# Patient Record
Sex: FEMALE | Race: WHITE | HISPANIC OR LATINO | URBAN - METROPOLITAN AREA
[De-identification: names, ages, dates, MRNs, and addresses within clinical notes are randomized per-mention and may not be internally consistent; named-entity substitution may affect disease eponyms.]

---

## 2019-09-04 ENCOUNTER — EMERGENCY (EMERGENCY)
Facility: HOSPITAL | Age: 34
LOS: 1 days | Discharge: HOME | End: 2019-09-04
Attending: EMERGENCY MEDICINE | Admitting: EMERGENCY MEDICINE
Payer: MEDICAID

## 2019-09-04 VITALS
OXYGEN SATURATION: 100 % | TEMPERATURE: 99 F | DIASTOLIC BLOOD PRESSURE: 84 MMHG | RESPIRATION RATE: 16 BRPM | HEART RATE: 97 BPM | SYSTOLIC BLOOD PRESSURE: 118 MMHG

## 2019-09-04 PROCEDURE — 99282 EMERGENCY DEPT VISIT SF MDM: CPT
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
## 2019-09-05 NOTE — ED PROVIDER NOTE - LEFT FACE
(+)left lateral periorbital ecchymosis and swelling, (+)tenderness to site and over lateral orbital rim/SWELLING/TENDERNESS TO PALPATION

## 2019-09-05 NOTE — ED PROVIDER NOTE - PATIENT PORTAL LINK FT
You can access the FollowMyHealth Patient Portal offered by [facility] by registering at the following website: [email or web address]. By joining [email or web address]’s FollowMyHealth portal, you will also be able to view your health information using other applications (apps) compatible with our system.

## 2019-09-05 NOTE — ED PROVIDER NOTE - PROGRESS NOTE DETAILS
Khadar TOWNSEND: Pt states she does not want to wait for CT.  Understands risks of leaving prior to complete evaluation.  Encouraged to f/u with PMD for further testing.  Pt states she feels safe going home.
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
## 2019-09-05 NOTE — ED PROVIDER NOTE - PHYSICAL EXAMINATION
(+)superficial abrasion <1cm lateral to right of mouth, no active bleeding, not through and through, no involvement of vermillion border
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
## 2019-09-05 NOTE — ED PROVIDER NOTE - EYES, MLM
Clear bilaterally, pupils equal, round and reactive to light.  EOMI without pain with movement, no proptosis.  Vision grossly normal.

## 2023-03-28 ENCOUNTER — APPOINTMENT (OUTPATIENT)
Dept: FAMILY MEDICINE | Facility: CLINIC | Age: 38
End: 2023-03-28

## 2023-04-06 ENCOUNTER — NON-APPOINTMENT (OUTPATIENT)
Age: 38
End: 2023-04-06

## 2023-04-06 ENCOUNTER — APPOINTMENT (OUTPATIENT)
Dept: FAMILY MEDICINE | Facility: CLINIC | Age: 38
End: 2023-04-06
Payer: MEDICAID

## 2023-04-06 VITALS
BODY MASS INDEX: 21.62 KG/M2 | TEMPERATURE: 98 F | HEIGHT: 63 IN | SYSTOLIC BLOOD PRESSURE: 110 MMHG | WEIGHT: 122 LBS | HEART RATE: 86 BPM | OXYGEN SATURATION: 99 % | DIASTOLIC BLOOD PRESSURE: 72 MMHG

## 2023-04-06 DIAGNOSIS — L42 PITYRIASIS ROSEA: ICD-10-CM

## 2023-04-06 DIAGNOSIS — Z00.00 ENCOUNTER FOR GENERAL ADULT MEDICAL EXAMINATION W/OUT ABNORMAL FINDINGS: ICD-10-CM

## 2023-04-06 PROCEDURE — 99385 PREV VISIT NEW AGE 18-39: CPT | Mod: 25

## 2023-04-06 NOTE — HISTORY OF PRESENT ILLNESS
[FreeTextEntry1] : New patient physical exam [de-identified] : 37-year-old female for new patient office visit.  Patient reporting rash since January all over her body.  Did see dermatologist and urgent care.  Was given triamcinolone and mometasone.  Was also given course prednisone and Zyrtec.  However patient could not tolerate oral steroids.  Notes side effect of bloating.  Rash is improving.  She did have blood work ordered by Derm which she has not done yet.  Denies any itching.

## 2023-04-06 NOTE — PHYSICAL EXAM
[No Acute Distress] : no acute distress [Well Nourished] : well nourished [Well Developed] : well developed [Well-Appearing] : well-appearing [Normal Voice/Communication] : normal voice/communication [Normal Sclera/Conjunctiva] : normal sclera/conjunctiva [No Respiratory Distress] : no respiratory distress  [Normal Rate] : normal rate

## 2023-04-06 NOTE — REVIEW OF SYSTEMS
[Skin Rash] : skin rash [Fever] : no fever [Fatigue] : no fatigue [Discharge] : no discharge [Earache] : no earache [Sore Throat] : no sore throat [Chest Pain] : no chest pain [Palpitations] : no palpitations [Shortness Of Breath] : no shortness of breath [Cough] : no cough [Abdominal Pain] : no abdominal pain [Dysuria] : no dysuria [Headache] : no headache

## 2023-04-06 NOTE — PLAN
[FreeTextEntry1] : 37-year-old female for new patient physical exam.  Routine labs ordered today.  Patient does have upcoming appoint with GYN for routine Pap.\par \par Pityriasis rosea.  Improved.  Discussed follow-up with dermatology.\par \par All questions answered.  Patient voiced understanding and in agreement to plan.  Return to clinic as recommended.

## 2023-04-10 LAB
ALBUMIN SERPL ELPH-MCNC: 4.2 G/DL
ALP BLD-CCNC: 56 U/L
ALT SERPL-CCNC: 8 U/L
ANA PAT FLD IF-IMP: ABNORMAL
ANA SER IF-ACNC: ABNORMAL
ANION GAP SERPL CALC-SCNC: 10 MMOL/L
APPEARANCE: CLEAR
AST SERPL-CCNC: 15 U/L
BASOPHILS # BLD AUTO: 0.02 K/UL
BASOPHILS NFR BLD AUTO: 0.4 %
BILIRUB SERPL-MCNC: 0.7 MG/DL
BILIRUBIN URINE: NEGATIVE
BLOOD URINE: NEGATIVE
BUN SERPL-MCNC: 8 MG/DL
CALCIUM SERPL-MCNC: 9.1 MG/DL
CHLORIDE SERPL-SCNC: 105 MMOL/L
CHOLEST SERPL-MCNC: 210 MG/DL
CO2 SERPL-SCNC: 22 MMOL/L
COLOR: YELLOW
CREAT SERPL-MCNC: 0.62 MG/DL
EGFR: 117 ML/MIN/1.73M2
EOSINOPHIL # BLD AUTO: 0.08 K/UL
EOSINOPHIL NFR BLD AUTO: 1.7 %
ESTIMATED AVERAGE GLUCOSE: 111 MG/DL
GLUCOSE QUALITATIVE U: NEGATIVE
GLUCOSE SERPL-MCNC: 90 MG/DL
HBA1C MFR BLD HPLC: 5.5 %
HCT VFR BLD CALC: 42.4 %
HDLC SERPL-MCNC: 69 MG/DL
HGB BLD-MCNC: 13.2 G/DL
IMM GRANULOCYTES NFR BLD AUTO: 0 %
KETONES URINE: NEGATIVE
LDLC SERPL CALC-MCNC: 128 MG/DL
LEUKOCYTE ESTERASE URINE: NEGATIVE
LYMPHOCYTES # BLD AUTO: 1.68 K/UL
LYMPHOCYTES NFR BLD AUTO: 35.1 %
MAN DIFF?: NORMAL
MCHC RBC-ENTMCNC: 28 PG
MCHC RBC-ENTMCNC: 31.1 GM/DL
MCV RBC AUTO: 90 FL
MONOCYTES # BLD AUTO: 0.4 K/UL
MONOCYTES NFR BLD AUTO: 8.4 %
NEUTROPHILS # BLD AUTO: 2.61 K/UL
NEUTROPHILS NFR BLD AUTO: 54.4 %
NITRITE URINE: NEGATIVE
NONHDLC SERPL-MCNC: 141 MG/DL
PH URINE: 7
PLATELET # BLD AUTO: 179 K/UL
POTASSIUM SERPL-SCNC: 3.9 MMOL/L
PROT SERPL-MCNC: 6.4 G/DL
PROTEIN URINE: NORMAL
RBC # BLD: 4.71 M/UL
RBC # FLD: 14.6 %
SODIUM SERPL-SCNC: 138 MMOL/L
SPECIFIC GRAVITY URINE: 1.02
TRIGL SERPL-MCNC: 66 MG/DL
TSH SERPL-ACNC: 1.02 UIU/ML
UROBILINOGEN URINE: NORMAL
WBC # FLD AUTO: 4.79 K/UL

## 2023-04-19 ENCOUNTER — LABORATORY RESULT (OUTPATIENT)
Age: 38
End: 2023-04-19

## 2023-04-19 ENCOUNTER — APPOINTMENT (OUTPATIENT)
Dept: RHEUMATOLOGY | Facility: CLINIC | Age: 38
End: 2023-04-19
Payer: MEDICAID

## 2023-04-19 ENCOUNTER — NON-APPOINTMENT (OUTPATIENT)
Age: 38
End: 2023-04-19

## 2023-04-19 VITALS
BODY MASS INDEX: 21.44 KG/M2 | DIASTOLIC BLOOD PRESSURE: 72 MMHG | SYSTOLIC BLOOD PRESSURE: 118 MMHG | HEIGHT: 63 IN | HEART RATE: 87 BPM | TEMPERATURE: 97.6 F | WEIGHT: 121 LBS | OXYGEN SATURATION: 100 %

## 2023-04-19 DIAGNOSIS — M54.2 CERVICALGIA: ICD-10-CM

## 2023-04-19 DIAGNOSIS — Z78.9 OTHER SPECIFIED HEALTH STATUS: ICD-10-CM

## 2023-04-19 DIAGNOSIS — Z82.49 FAMILY HISTORY OF ISCHEMIC HEART DISEASE AND OTHER DISEASES OF THE CIRCULATORY SYSTEM: ICD-10-CM

## 2023-04-19 PROCEDURE — 99205 OFFICE O/P NEW HI 60 MIN: CPT

## 2023-04-19 RX ORDER — PNV NO.95/FERROUS FUM/FOLIC AC 28MG-0.8MG
TABLET ORAL
Refills: 0 | Status: ACTIVE | COMMUNITY

## 2023-04-19 NOTE — HISTORY OF PRESENT ILLNESS
[FreeTextEntry1] : 37-year-old [place] female, here for the first time w/ +KAUR 1:80 Speckled; with diffuse rash perhaps pityriasis rosea to see Derm as requested. \par Patient states she had this diffuse rash started in 2023 after she had a cold.  Patient states the rash was itchy initially and not as itchy now.\par States she has history of allergies to multiple things including dust, cats.  \par Denies any fever or chills at this time.\par Denies any malar rash thus far.\par Denies any photosensitivity thus far.\par Denies any nasal ulcers or oral ulcers or genital ulcers thus far.\par Denies any Raynaud's-like symptoms at this time.\par Denies any dry eyes.\par Denies any dry mouth.\par Denies any infectious diarrhea or  symptoms at this time.\par Denies any history of blood clots.\par Denies any history of stroke.\par States she has had  labor with 2 -3 of her pregnancies with total of 6 pregnancies thus far.\par States she had 1 miscarriage at about 5 to 6 weeks into pregnancy.\par States she notices intermittent neck pain without paresthesias perhaps with holding the baby for a long time.\par Denies any swelling or redness or warmth of any joints.\par Denies any family history of lupus to her knowledge.\par \par \par

## 2023-04-19 NOTE — PHYSICAL EXAM
[General Appearance - Alert] : alert [General Appearance - In No Acute Distress] : in no acute distress [Sclera] : the sclera and conjunctiva were normal [Extraocular Movements] : extraocular movements were intact [Outer Ear] : the ears and nose were normal in appearance [Neck Appearance] : the appearance of the neck was normal [] : no respiratory distress [Respiration, Rhythm And Depth] : normal respiratory rhythm and effort [Heart Rate And Rhythm] : heart rate was normal and rhythm regular [Heart Sounds] : normal S1 and S2 [Abdomen Soft] : soft [Abdomen Tenderness] : non-tender [Cervical Lymph Nodes Enlarged Anterior Bilaterally] : anterior cervical [Supraclavicular Lymph Nodes Enlarged Bilaterally] : supraclavicular [No CVA Tenderness] : no ~M costovertebral angle tenderness [Motor Tone] : muscle strength and tone were normal [No Focal Deficits] : no focal deficits [Impaired Insight] : insight and judgment were intact [Mood] : the mood was normal [FreeTextEntry1] : small circular red/pink lesions all over

## 2023-04-19 NOTE — ASSESSMENT
[FreeTextEntry1] : 37-year-old female, here for the first time w/ +KAUR 1:80 Speckled; with diffuse rash perhaps pityriasis rosea to see Derm as requested. \par -reviewed labs 4/6/23 w/ +KAUR 1:80 speckled; CBC ok; CMP ok; UA w/ trace protein \par -No synovitis or effusion on exam noted today and advised to monitor.\par \par +KAUR 1:80 Speckled: low titer +\par -Clinically without much symptoms of CTD/lupus/sjogren syndrome at this time and educated on symptoms to monitor for in detail if she evolves.\par -lab as below w/ disease activity markers as below to be complete\par -will check thyroid abs as discussed +KAUR can be seen with cross reactivity\par -hx of 1 miscarriage: will check APLS panel to be complete and can be related to chromosomal abnormality; pre-term labor also reported with 2-3 pregnancies\par \par Cervicalgia: intermittent tenderness in c-spine w/ rotation w/ good ROM w/o paresthesias \par -Referred for xray of c-spine to evaluate for structural changes, DDD; muscle spasm \par \par Diffuse rash: small circular red/pink spots \par -perhaps pityriasis rosea \par -Derm referral given as requested as states still present for 4 mo \par \par -educated on symptoms to monitor for in detail and alert us if any concerns.\par -knows to stay up to date on health maintenance w/ PCP\par -f/u in 10-14 days w/ labs, xray please\par

## 2023-04-20 LAB
C3 SERPL-MCNC: 121 MG/DL
C4 SERPL-MCNC: 32 MG/DL
CREAT SPEC-SCNC: 35 MG/DL
CREAT/PROT UR: 0.1 RATIO
CRP SERPL-MCNC: <3 MG/L
ERYTHROCYTE [SEDIMENTATION RATE] IN BLOOD BY WESTERGREN METHOD: 7 MM/HR
PROT UR-MCNC: 5 MG/DL
THYROGLOB AB SERPL-ACNC: <20 IU/ML
THYROPEROXIDASE AB SERPL IA-ACNC: <10 IU/ML
TSH SERPL-ACNC: 0.93 UIU/ML

## 2023-04-24 LAB
ANA PAT FLD IF-IMP: ABNORMAL
ANA SER IF-ACNC: ABNORMAL
B2 GLYCOPROT1 IGG SER-ACNC: <5 SGU
B2 GLYCOPROT1 IGM SER-ACNC: <5 SMU
CARDIOLIPIN IGM SER-MCNC: 7.6 MPL
CARDIOLIPIN IGM SER-MCNC: <5 GPL
DSDNA AB SER-ACNC: <12 IU/ML
G6PD SER-CCNC: 13.9 U/G HGB
HLA-B27 QL NAA+PROBE: NORMAL

## 2023-05-22 ENCOUNTER — LABORATORY RESULT (OUTPATIENT)
Age: 38
End: 2023-05-22

## 2023-05-22 ENCOUNTER — APPOINTMENT (OUTPATIENT)
Dept: PEDIATRIC ALLERGY IMMUNOLOGY | Facility: CLINIC | Age: 38
End: 2023-05-22
Payer: MEDICAID

## 2023-05-22 VITALS
TEMPERATURE: 97.6 F | DIASTOLIC BLOOD PRESSURE: 74 MMHG | HEIGHT: 63 IN | SYSTOLIC BLOOD PRESSURE: 110 MMHG | WEIGHT: 122 LBS | OXYGEN SATURATION: 100 % | HEART RATE: 76 BPM | BODY MASS INDEX: 21.62 KG/M2

## 2023-05-22 DIAGNOSIS — Z91.018 ALLERGY TO OTHER FOODS: ICD-10-CM

## 2023-05-22 DIAGNOSIS — R10.9 UNSPECIFIED ABDOMINAL PAIN: ICD-10-CM

## 2023-05-22 DIAGNOSIS — J30.9 ALLERGIC RHINITIS, UNSPECIFIED: ICD-10-CM

## 2023-05-22 PROCEDURE — 99204 OFFICE O/P NEW MOD 45 MIN: CPT

## 2023-05-22 NOTE — PHYSICAL EXAM
[Alert] : alert [No Acute Distress] : no acute distress [Normal Voice/Communication] : normal voice communication [Supple] : the neck was supple [Normal Cervical Lymph Nodes] : cervical [No Joint Swelling or Erythema] : no joint swelling or erythema [No clubbing] : no clubbing [No Cyanosis] : no cyanosis [Alert, Awake, Oriented as Age-Appropriate] : alert, awake, oriented as age appropriate [de-identified] : Eyes clear, prominent shiners, capillaries visible on upper eyelids. [de-identified] : Throat clear, no mouth lesions.  Nasal mucosa pink, mild stuffy nose, no discharge.  Tympanic membranes normal.  No sinus tenderness. [de-identified] : Chest clear, good air entry, no wheezing or crackles. [de-identified] : S1-S2 regular, no murmurs. [de-identified] : Abdomen soft, nontender, no organomegaly. [de-identified] : Fine maculopapular rash pink on the lower back, upper thighs.  Multiple small reddish-brownish macules and papules especially on forearms, but also lower legs, abdomen.

## 2023-05-22 NOTE — SOCIAL HISTORY
[de-identified] : Moved in current residence which is an old house 1-1/2 years ago; house has oil radiator heating, window air conditioning, carpet in the bedroom, she has 2 cats.  Her boyfriend smokes outside.  She never smoked.

## 2023-05-22 NOTE — REASON FOR VISIT
[Evaluation/Consultation] : an evaluation/consultation of [Allergic Rhinitis] : allergic rhinitis [Rash] : rash

## 2023-05-22 NOTE — REVIEW OF SYSTEMS
[Eye Itching] : itchy eyes [Nasal Congestion] : nasal congestion [Difficulty Breathing] : no dyspnea [Wheezing] : no wheezing [Abdominal Pain] : abdominal pain [Pruritus] : pruritus [Recurrent Sinus Infections] : no recurrent sinus infections [Recurrent Throat Infections] : no recurrence of throat infections [Recurrent Bronchitis] : no recurrent bronchitis [Recurrent Ear Infections] : no recurrence or ear infections [Recurrent Skin Infections] : no recurrent skin infections [Recurrent Pneumonia] : no ~T recurrent pneumonia

## 2023-05-22 NOTE — HISTORY OF PRESENT ILLNESS
[de-identified] : In office to be evaluated for rash.  In late January she noticed her skin skin looking differently, similar to exposure to cold.  Subsequently she developed an itchy rash, initially on the arms, that within a few weeks spread to her entire body and finally around her eyes.  Seen by dermatologist, diagnosed with pityriasis rosea.  No skin biopsy was done.  She was prescribed oral corticosteroids, which she could only take for 2 days, because she developed severe abdominal pain.  It did not help.  Topical corticosteroid helped the itching of the rash but did not make the rash disappeared.  The rash is healing with hyperpigmentation.  In March, she had swollen eyes and significant allergic shiners for 2 days.  She had a brief viral illness with nasal symptoms but no fever in early January, before the rash started.  At the time also she was taking a new vitamin, which was discontinued.  In early April, she had significant itching.  She also itches more when she is hot.  She did not change soap, detergent, or diet.  Blood work showed KAUR 1: 80 in early April, repeat several weeks later it was 1: 160, with all other serology negative for autoimmune disease, including a normal ESR.  CBC with differential was normal, absolute eosinophil count 80.  She was evaluated by rheumatologist, unremarkable.\par Allergies: She gets on and off stuffy nose and nose blowing in the morning all year-round for many years, she has sniffles.  Allergy symptoms were increased few days ago.  She takes no medications for allergies, she does not require frequent antibiotics, she does not have chest symptoms.\par Foods: Sesame seeds: Initially itching after eating.  From 2018, she developed face swelling, throat constriction, itching, after exposure to sesame seeds.  She has no EpiPen.  Accidental exposure during pregnancy caused premature contractions as well.\par After pregnancy 2 years ago, she has abdominal pain on and off and increased gas.  She tries to minimize dairy

## 2023-05-22 NOTE — ASSESSMENT
[FreeTextEntry1] : Pruritic rash of several months duration.  Needs skin biopsy.  Possible pityriasis rosea, because it started after a viral illness.  Blood work for food allergies, environmental allergies, celiac screening.\par KAUR positive: Increased titers within a few weeks.  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  Discussed need for EpiPen.  Patient wants to wait, until results of blood work back.

## 2023-05-23 ENCOUNTER — NON-APPOINTMENT (OUTPATIENT)
Age: 38
End: 2023-05-23

## 2023-05-24 ENCOUNTER — NON-APPOINTMENT (OUTPATIENT)
Age: 38
End: 2023-05-24

## 2023-05-25 ENCOUNTER — LABORATORY RESULT (OUTPATIENT)
Age: 38
End: 2023-05-25

## 2023-05-31 ENCOUNTER — NON-APPOINTMENT (OUTPATIENT)
Age: 38
End: 2023-05-31

## 2023-06-08 ENCOUNTER — NON-APPOINTMENT (OUTPATIENT)
Age: 38
End: 2023-06-08

## 2023-06-12 ENCOUNTER — NON-APPOINTMENT (OUTPATIENT)
Age: 38
End: 2023-06-12

## 2023-06-26 ENCOUNTER — APPOINTMENT (OUTPATIENT)
Dept: RHEUMATOLOGY | Facility: CLINIC | Age: 38
End: 2023-06-26
Payer: MEDICAID

## 2023-06-26 ENCOUNTER — LABORATORY RESULT (OUTPATIENT)
Age: 38
End: 2023-06-26

## 2023-06-26 VITALS
TEMPERATURE: 98.5 F | SYSTOLIC BLOOD PRESSURE: 114 MMHG | BODY MASS INDEX: 21.97 KG/M2 | DIASTOLIC BLOOD PRESSURE: 77 MMHG | OXYGEN SATURATION: 100 % | HEIGHT: 63 IN | WEIGHT: 124 LBS | HEART RATE: 77 BPM

## 2023-06-26 DIAGNOSIS — Z87.51 PERSONAL HISTORY OF PRE-TERM LABOR: ICD-10-CM

## 2023-06-26 DIAGNOSIS — Z87.59 PERSONAL HISTORY OF OTHER COMPLICATIONS OF PREGNANCY, CHILDBIRTH AND THE PUERPERIUM: ICD-10-CM

## 2023-06-26 DIAGNOSIS — R21 RASH AND OTHER NONSPECIFIC SKIN ERUPTION: ICD-10-CM

## 2023-06-26 DIAGNOSIS — R76.8 OTHER SPECIFIED ABNORMAL IMMUNOLOGICAL FINDINGS IN SERUM: ICD-10-CM

## 2023-06-26 PROCEDURE — 99214 OFFICE O/P EST MOD 30 MIN: CPT

## 2023-06-26 NOTE — HISTORY OF PRESENT ILLNESS
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 \par Patient states she had this diffuse rash started in 2023 after she had a cold. Patient states the rash was itchy initially and not as itchy now.\par States she has history of allergies to multiple things including dust, cats & saw Allergist.\par Denies any fever or chills at this time.\par Denies any malar rash thus far.\par Denies any photosensitivity thus far.\par Denies any nasal ulcers or oral ulcers or genital ulcers thus far.\par Denies any Raynaud's-like symptoms at this time.\par Denies any dry eyes.\par Denies any dry mouth.\par Denies any infectious diarrhea or  symptoms at this time.\par Denies any history of blood clots.\par Denies any history of stroke.\par States she has had  labor with 2 -3 of her pregnancies with total of 6 pregnancies thus far.\par States she had 1 miscarriage at about 5 to 6 weeks into pregnancy.\par States she notices intermittent neck pain without paresthesias perhaps with holding the baby for a long time & defers xray for it.\par Denies any swelling or redness or warmth of any joints.\par Denies any family history of lupus to her knowledge.\par \par

## 2023-06-26 NOTE — PHYSICAL EXAM
[General Appearance - Alert] : alert [General Appearance - In No Acute Distress] : in no acute distress [Sclera] : the sclera and conjunctiva were normal [Extraocular Movements] : extraocular movements were intact [Outer Ear] : the ears and nose were normal in appearance [Neck Appearance] : the appearance of the neck was normal [] : no respiratory distress [Respiration, Rhythm And Depth] : normal respiratory rhythm and effort [Heart Rate And Rhythm] : heart rate was normal and rhythm regular [Heart Sounds] : normal S1 and S2 [Abdomen Soft] : soft [Abdomen Tenderness] : non-tender [Cervical Lymph Nodes Enlarged Anterior Bilaterally] : anterior cervical [Supraclavicular Lymph Nodes Enlarged Bilaterally] : supraclavicular [No CVA Tenderness] : no ~M costovertebral angle tenderness [Motor Tone] : muscle strength and tone were normal [No Focal Deficits] : no focal deficits [Impaired Insight] : insight and judgment were intact [Mood] : the mood was normal [FreeTextEntry1] : Diffuse small circular red/pink spots

## 2023-06-26 NOTE — ASSESSMENT
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 \par -reviewed labs 5/22/23 KAUR negative; 4/19/23 +KAUR 1:160 speckled; DS-DNA neg; C3/C4 normal; urine prot/creat ratio=0.1; ESR normal CRP normal; LAC negative; [id number] IgM and IgG negative; cardiolipin IgM and IgG negative; thyroid abs negative; 4/6/23 w/ +KAUR 1:80 speckled; CBC ok; CMP ok; UA w/ trace protein \par -No synovitis or effusion on exam noted today and advised to monitor.\par \par hx of +KAUR 1:160 Speckled: borderline + \par -Clinically without much symptoms of CTD/lupus/sjogren syndrome at this time and educated on symptoms to monitor for in detail if she evolves.\par -thyroid abs Negative; checked to see if +KAUR from cross reactivity\par -hx of 1 miscarriage: APLS x 1 negative; will check APLS panel 2nd time to be complete and discussed miscarriage can be otherwise related to chromosomal abnormality\par -pre-term labor also reported with 2-3 pregnancies; APLS panel 4/19/23 x 1 negative; check APLS panel 2nd time to be complete\par \par Cervicalgia: intermittent tenderness in c-spine w/ rotation w/ good ROM w/o paresthesias \par -defers xray offered \par \par Diffuse rash: small circular red/pink spots \par -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

## 2023-07-17 ENCOUNTER — APPOINTMENT (OUTPATIENT)
Dept: INTERNAL MEDICINE | Facility: CLINIC | Age: 38
End: 2023-07-17
Payer: MEDICAID

## 2023-07-17 VITALS
OXYGEN SATURATION: 98 % | HEIGHT: 63 IN | SYSTOLIC BLOOD PRESSURE: 116 MMHG | BODY MASS INDEX: 21.97 KG/M2 | WEIGHT: 124 LBS | HEART RATE: 72 BPM | DIASTOLIC BLOOD PRESSURE: 72 MMHG

## 2023-07-17 DIAGNOSIS — L41.0 PITYRIASIS LICHENOIDES ET VARIOLIFORMIS ACUTA: ICD-10-CM

## 2023-07-17 DIAGNOSIS — B58.9 TOXOPLASMOSIS, UNSPECIFIED: ICD-10-CM

## 2023-07-17 PROCEDURE — 99204 OFFICE O/P NEW MOD 45 MIN: CPT | Mod: 25
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
## 2023-07-17 NOTE — PHYSICAL EXAM
[General Appearance - Alert] : alert [General Appearance - In No Acute Distress] : in no acute distress [Sclera] : the sclera and conjunctiva were normal [PERRL With Normal Accommodation] : pupils were equal in size, round, reactive to light [Extraocular Movements] : extraocular movements were intact [Outer Ear] : the ears and nose were normal in appearance [Oropharynx] : the oropharynx was normal with no thrush [Neck Appearance] : the appearance of the neck was normal [Neck Cervical Mass (___cm)] : no neck mass was observed [Jugular Venous Distention Increased] : there was no jugular-venous distention [Thyroid Diffuse Enlargement] : the thyroid was not enlarged [Auscultation Breath Sounds / Voice Sounds] : lungs were clear to auscultation bilaterally [Heart Sounds] : normal S1 and S2 [Heart Rate And Rhythm] : heart rate was normal and rhythm regular [Heart Sounds Gallop] : no gallops [Murmurs] : no murmurs [Heart Sounds Pericardial Friction Rub] : no pericardial rub [Full Pulse] : the pedal pulses are present [Edema] : there was no peripheral edema [Bowel Sounds] : normal bowel sounds [Abdomen Soft] : soft [Abdomen Tenderness] : non-tender [] : no hepato-splenomegaly [Abdomen Mass (___ Cm)] : no abdominal mass palpated [Costovertebral Angle Tenderness] : no CVA tenderness [No Palpable Adenopathy] : no palpable adenopathy [Musculoskeletal - Swelling] : no joint swelling [Nail Clubbing] : no clubbing  or cyanosis of the fingernails [Motor Tone] : muscle strength and tone were normal [FreeTextEntry1] : RAISED  PUSTULAR LESIONS ON BOTH UPPER ADN LOWER EXTRMIETS

## 2023-07-17 NOTE — HISTORY OF PRESENT ILLNESS
[FreeTextEntry1] : 37YO FEMALE WHO DEVELOPED RASH ABOUT 6 MONTHS AGO  HAS SEEN DERM AND HAD A SKIN BX DX PITYRIASIS LICHENOIDES  AND AS PART OF WORK UP HAD A TOXO IGM  DONE\par HERE FOR ID EVALUATION

## 2023-07-20 LAB
T GONDII AB SER-IMP: ABNORMAL
T GONDII AB SER-IMP: POSITIVE
T GONDII IGG SER QL: 67.3 IU/ML
T GONDII IGM SER QL: 9.4 AU/ML

## 2023-07-24 ENCOUNTER — NON-APPOINTMENT (OUTPATIENT)
Age: 38
End: 2023-07-24

## 2023-08-01 ENCOUNTER — NON-APPOINTMENT (OUTPATIENT)
Age: 38
End: 2023-08-01

## 2023-08-04 LAB — T GONDII AB TITR SER: NORMAL

## 2023-08-07 ENCOUNTER — APPOINTMENT (OUTPATIENT)
Dept: RHEUMATOLOGY | Facility: CLINIC | Age: 38
End: 2023-08-07